# Patient Record
Sex: MALE | Race: BLACK OR AFRICAN AMERICAN | NOT HISPANIC OR LATINO | URBAN - METROPOLITAN AREA
[De-identification: names, ages, dates, MRNs, and addresses within clinical notes are randomized per-mention and may not be internally consistent; named-entity substitution may affect disease eponyms.]

---

## 2025-06-30 ENCOUNTER — OFFICE VISIT (OUTPATIENT)
Dept: URGENT CARE | Age: 35
End: 2025-06-30
Payer: MEDICAID

## 2025-06-30 VITALS
OXYGEN SATURATION: 100 % | TEMPERATURE: 98.2 F | WEIGHT: 205 LBS | RESPIRATION RATE: 18 BRPM | SYSTOLIC BLOOD PRESSURE: 134 MMHG | HEART RATE: 91 BPM | BODY MASS INDEX: 27.77 KG/M2 | HEIGHT: 72 IN | DIASTOLIC BLOOD PRESSURE: 94 MMHG

## 2025-06-30 DIAGNOSIS — H10.32 ACUTE CONJUNCTIVITIS OF LEFT EYE, UNSPECIFIED ACUTE CONJUNCTIVITIS TYPE: Primary | ICD-10-CM

## 2025-06-30 RX ORDER — KETOROLAC TROMETHAMINE 5 MG/ML
SOLUTION OPHTHALMIC
Qty: 5 ML | Refills: 0 | Status: SHIPPED | OUTPATIENT
Start: 2025-06-30

## 2025-06-30 RX ORDER — OFLOXACIN 3 MG/ML
SOLUTION/ DROPS OPHTHALMIC
Qty: 10 ML | Refills: 0 | Status: SHIPPED | OUTPATIENT
Start: 2025-06-30

## 2025-06-30 ASSESSMENT — PAIN SCALES - GENERAL: PAINLEVEL_OUTOF10: 8

## 2025-06-30 ASSESSMENT — ENCOUNTER SYMPTOMS
CONSTITUTIONAL NEGATIVE: 1
EYE REDNESS: 1
PHOTOPHOBIA: 1
EYE PAIN: 1
EYE DISCHARGE: 0

## 2025-06-30 NOTE — LETTER
June 30, 2025     Patient: Georgi Mccray   YOB: 1990   Date of Visit: 6/30/2025       To Whom It May Concern:    Georgi Mccray was seen in my clinic on 6/30/2025 at 9:35 am. Please excuse Georgi for his absence from work on this day to make the appointment. He may return on Wednesday 6/2/25    If you have any questions or concerns, please don't hesitate to call.         Sincerely,         Renea Grissom MD        CC: No Recipients

## 2025-06-30 NOTE — PROGRESS NOTES
Subjective   Patient ID: Georgi Mccray is a 34 y.o. male. They present today with a chief complaint of Eye Problem (Feels like something scratching left eye, very red. Hurt yesterday then worsened into today. ).    History of Present Illness  HPI    Past Medical History  Allergies as of 06/30/2025    (No Known Allergies)       Prescriptions Prior to Admission[1]     Medical History[2]    Surgical History[3]     reports that he has never smoked. He has never used smokeless tobacco. He reports that he does not drink alcohol and does not use drugs.    Review of Systems  Review of Systems   Constitutional: Negative.    Eyes:  Positive for photophobia, pain and redness. Negative for discharge.                                  Objective    Vitals:    06/30/25 0939   BP: (!) 134/94   BP Location: Right arm   Patient Position: Sitting   BP Cuff Size: Adult   Pulse: 91   Resp: 18   Temp: 36.8 °C (98.2 °F)   TempSrc: Oral   SpO2: 100%   Weight: 93 kg (205 lb)   Height: 1.829 m (6')     No LMP for male patient.    Physical Exam  Constitutional:       Appearance: Normal appearance.   Eyes:      Extraocular Movements: Extraocular movements intact.      Conjunctiva/sclera:      Left eye: Left conjunctiva is injected.      Pupils: Pupils are equal, round, and reactive to light.      Comments: Eye:Tetracaine eye drop applied to affected eye, Fluoresceen staining and UV light exam performed.    Neurological:      Mental Status: He is alert.         Procedures    Point of Care Test & Imaging Results from this visit  No results found for this visit on 06/30/25.   Imaging  No results found.    Cardiology, Vascular, and Other Imaging  No other imaging results found for the past 2 days      Diagnostic study results (if any) were reviewed by Renea Grissom MD.    Assessment/Plan   Allergies, medications, history, and pertinent labs/EKGs/Imaging reviewed by Renea Grissom MD.     Medical Decision Making  Severe conjunctivitis, no  foreign body or corneal abrasion seen, but if not improved by tomorrow needs to see an Ophtalmologist.    Orders and Diagnoses  There are no diagnoses linked to this encounter.    Medical Admin Record      Patient disposition: Home    Electronically signed by Renea Grissom MD  10:18 AM           [1] (Not in a hospital admission)  [2] No past medical history on file.  [3] No past surgical history on file.

## 2025-07-02 ENCOUNTER — APPOINTMENT (OUTPATIENT)
Dept: OPHTHALMOLOGY | Facility: CLINIC | Age: 35
End: 2025-07-02

## 2025-07-02 ENCOUNTER — OFFICE VISIT (OUTPATIENT)
Dept: URGENT CARE | Age: 35
End: 2025-07-02
Payer: MEDICAID

## 2025-07-02 VITALS
DIASTOLIC BLOOD PRESSURE: 92 MMHG | HEART RATE: 84 BPM | SYSTOLIC BLOOD PRESSURE: 131 MMHG | TEMPERATURE: 98.1 F | OXYGEN SATURATION: 98 % | RESPIRATION RATE: 17 BRPM

## 2025-07-02 DIAGNOSIS — H10.32 ACUTE CONJUNCTIVITIS OF LEFT EYE, UNSPECIFIED ACUTE CONJUNCTIVITIS TYPE: Primary | ICD-10-CM

## 2025-07-02 ASSESSMENT — PAIN SCALES - GENERAL: PAINLEVEL_OUTOF10: 10-WORST PAIN EVER

## 2025-07-02 NOTE — PATIENT INSTRUCTIONS
Call [phone] to schedule an appointment with ophthalmology, or visit [email or web address]     If you cannot be seen today or early tomorrow, go to the ED with ophthalmology available. (Main campus)

## 2025-07-02 NOTE — PROGRESS NOTES
Subjective   Patient ID: Georgi Mccray is a 34 y.o. male. They present today with a chief complaint of Eye Problem (Left eye still feels like something scratching under upper eyelid. Was seen 6/30/25 for same issue.).    History of Present Illness  Endorses worsening irritation, watery drainage, light sensitivity, redness of the left eye since being evaluated here 2 days ago.  States his vision out of the left eye is becoming more blurry.  He was diagnosed with acute conjunctivitis of the left eye and prescribed ketorolac and ofloxacin drops, which she reports adherence to.  Denies injury to the eye, purulent drainage      Eye Problem      Past Medical History  Allergies as of 07/02/2025    (No Known Allergies)       Prescriptions Prior to Admission[1]     Medical History[2]    Surgical History[3]     reports that he has never smoked. He has never used smokeless tobacco. He reports that he does not drink alcohol and does not use drugs.    Review of Systems  Pertinent systems reviewed and were negative unless otherwise stated in HPI.    Objective    Vitals:    07/02/25 1040   BP: (!) 131/92   BP Location: Right arm   Patient Position: Sitting   BP Cuff Size: Adult   Pulse: 84   Resp: 17   Temp: 36.7 °C (98.1 °F)   TempSrc: Oral   SpO2: 98%     No LMP for male patient.    Physical Exam  Constitutional:       General: He is not in acute distress.  Eyes:      General:         Left eye: Discharge (watery) present.     Extraocular Movements: Extraocular movements intact.      Pupils: Pupils are equal, round, and reactive to light.      Comments: Left conjunctival injection. No FB noted         Diagnostic study results (if any) were reviewed by Rickie Drew PA-C.    Assessment/Plan   Allergies, medications, history, and pertinent labs/EKGs/imaging reviewed by Rickie Drew PA-C.     Medical Decision Making  Requires urgent ophthalmology follow-up given ketorolac and antibiotic are providing no relief and  vision is worsening.  No foreign bodies noted.  Patient declined to perform repeat visual acuity today.  Low concern for bacterial conjunctivitis    Orders and Diagnoses  Diagnoses and all orders for this visit:  Acute conjunctivitis of left eye, unspecified acute conjunctivitis type  -     Referral to Ophthalmology; Future      Medical Admin Record      Disposition: Home    Electronically signed by Rickie Drew PA-C             [1] (Not in a hospital admission)   [2] No past medical history on file.  [3] No past surgical history on file.

## 2025-07-03 ENCOUNTER — TELEPHONE (OUTPATIENT)
Dept: OPHTHALMOLOGY | Facility: CLINIC | Age: 35
End: 2025-07-03